# Patient Record
Sex: FEMALE | Race: WHITE
[De-identification: names, ages, dates, MRNs, and addresses within clinical notes are randomized per-mention and may not be internally consistent; named-entity substitution may affect disease eponyms.]

---

## 2018-10-14 ENCOUNTER — HOSPITAL ENCOUNTER (OUTPATIENT)
Dept: HOSPITAL 76 - EMS | Age: 63
Discharge: ANOTHER HOSPITAL | End: 2018-10-14
Attending: SURGERY
Payer: MEDICAID

## 2018-10-14 ENCOUNTER — HOSPITAL ENCOUNTER (EMERGENCY)
Dept: HOSPITAL 76 - ED | Age: 63
Discharge: ANOTHER HOSPITAL | End: 2018-10-14
Payer: SELF-PAY

## 2018-10-14 VITALS — DIASTOLIC BLOOD PRESSURE: 84 MMHG | SYSTOLIC BLOOD PRESSURE: 147 MMHG

## 2018-10-14 DIAGNOSIS — I10: ICD-10-CM

## 2018-10-14 DIAGNOSIS — R07.9: Primary | ICD-10-CM

## 2018-10-14 DIAGNOSIS — R06.00: ICD-10-CM

## 2018-10-14 DIAGNOSIS — Z79.82: ICD-10-CM

## 2018-10-14 DIAGNOSIS — Z79.84: ICD-10-CM

## 2018-10-14 DIAGNOSIS — I25.110: Primary | ICD-10-CM

## 2018-10-14 DIAGNOSIS — E11.9: ICD-10-CM

## 2018-10-14 DIAGNOSIS — R07.2: ICD-10-CM

## 2018-10-14 LAB
ALBUMIN DIAFP-MCNC: 4.7 G/DL (ref 3.2–5.5)
ALBUMIN/GLOB SERPL: 1.8 {RATIO} (ref 1–2.2)
ALP SERPL-CCNC: 62 IU/L (ref 42–121)
ALT SERPL W P-5'-P-CCNC: 18 IU/L (ref 10–60)
ANION GAP SERPL CALCULATED.4IONS-SCNC: 9 MMOL/L (ref 6–13)
AST SERPL W P-5'-P-CCNC: 20 IU/L (ref 10–42)
BASOPHILS NFR BLD AUTO: 0.1 10^3/UL (ref 0–0.1)
BASOPHILS NFR BLD AUTO: 1 %
BILIRUB BLD-MCNC: 0.6 MG/DL (ref 0.2–1)
BUN SERPL-MCNC: 13 MG/DL (ref 6–20)
CALCIUM UR-MCNC: 10 MG/DL (ref 8.5–10.3)
CHLORIDE SERPL-SCNC: 101 MMOL/L (ref 101–111)
CO2 SERPL-SCNC: 29 MMOL/L (ref 21–32)
CREAT SERPLBLD-SCNC: 0.6 MG/DL (ref 0.4–1)
EOSINOPHIL # BLD AUTO: 0.2 10^3/UL (ref 0–0.7)
EOSINOPHIL NFR BLD AUTO: 3 %
ERYTHROCYTE [DISTWIDTH] IN BLOOD BY AUTOMATED COUNT: 13.9 % (ref 12–15)
GFRSERPLBLD MDRD-ARVRAT: 101 ML/MIN/{1.73_M2} (ref 89–?)
GLOBULIN SER-MCNC: 2.6 G/DL (ref 2.1–4.2)
GLUCOSE SERPL-MCNC: 129 MG/DL (ref 70–100)
HGB UR QL STRIP: 13.8 G/DL (ref 12–16)
INR PPP: 1.1 (ref 0.8–1.2)
LIPASE SERPL-CCNC: 31 U/L (ref 22–51)
LYMPHOCYTES # SPEC AUTO: 1.8 10^3/UL (ref 1.5–3.5)
LYMPHOCYTES NFR BLD AUTO: 29.7 %
MAGNESIUM SERPL-MCNC: 2.2 MG/DL (ref 1.7–2.8)
MCH RBC QN AUTO: 29.3 PG (ref 27–31)
MCHC RBC AUTO-ENTMCNC: 33.5 G/DL (ref 32–36)
MCV RBC AUTO: 87.3 FL (ref 81–99)
MONOCYTES # BLD AUTO: 0.5 10^3/UL (ref 0–1)
MONOCYTES NFR BLD AUTO: 7.6 %
NEUTROPHILS # BLD AUTO: 3.6 10^3/UL (ref 1.5–6.6)
NEUTROPHILS # SNV AUTO: 6.1 X10^3/UL (ref 4.8–10.8)
NEUTROPHILS NFR BLD AUTO: 58.7 %
PDW BLD AUTO: 8.6 FL (ref 7.9–10.8)
PLATELET # BLD: 249 10^3/UL (ref 130–450)
PROT SPEC-MCNC: 7.3 G/DL (ref 6.7–8.2)
PROTHROM ACT/NOR PPP: 12.6 SECS (ref 9.9–12.6)
RBC MAR: 4.7 10^6/UL (ref 4.2–5.4)
SODIUM SERPLBLD-SCNC: 139 MMOL/L (ref 135–145)

## 2018-10-14 PROCEDURE — 96365 THER/PROPH/DIAG IV INF INIT: CPT

## 2018-10-14 PROCEDURE — 83735 ASSAY OF MAGNESIUM: CPT

## 2018-10-14 PROCEDURE — 85025 COMPLETE CBC W/AUTO DIFF WBC: CPT

## 2018-10-14 PROCEDURE — 96375 TX/PRO/DX INJ NEW DRUG ADDON: CPT

## 2018-10-14 PROCEDURE — 83690 ASSAY OF LIPASE: CPT

## 2018-10-14 PROCEDURE — 99285 EMERGENCY DEPT VISIT HI MDM: CPT

## 2018-10-14 PROCEDURE — 85610 PROTHROMBIN TIME: CPT

## 2018-10-14 PROCEDURE — 80053 COMPREHEN METABOLIC PANEL: CPT

## 2018-10-14 PROCEDURE — 84484 ASSAY OF TROPONIN QUANT: CPT

## 2018-10-14 PROCEDURE — 71046 X-RAY EXAM CHEST 2 VIEWS: CPT

## 2018-10-14 PROCEDURE — 36415 COLL VENOUS BLD VENIPUNCTURE: CPT

## 2018-10-14 PROCEDURE — 93005 ELECTROCARDIOGRAM TRACING: CPT

## 2018-10-14 PROCEDURE — 83880 ASSAY OF NATRIURETIC PEPTIDE: CPT

## 2018-10-14 PROCEDURE — 85730 THROMBOPLASTIN TIME PARTIAL: CPT

## 2018-10-14 PROCEDURE — 99284 EMERGENCY DEPT VISIT MOD MDM: CPT

## 2018-10-14 NOTE — XRAY REPORT
Reason:  chest pain left sided

Procedure Date:  10/14/2018   

Accession Number:  [id number] / [id number]                    

Procedure:  XR  - Chest 2 View X-Ray CPT Code:  70364

 

FULL RESULT:

 

 

EXAM:

CHEST RADIOGRAPHY

 

EXAM DATE: 10/14/2018 02:16 PM.

 

CLINICAL HISTORY: Chest pain left sided.

 

COMPARISON: None.

 

TECHNIQUE: 2 views.

 

FINDINGS:

Lungs/Pleura: No focal opacities evident. No pleural effusion. No 

pneumothorax. Normal volumes.

 

Mediastinum: Heart and mediastinal contours are unremarkable.

 

Other: None.

IMPRESSION: Negative chest.

 

RADIA

## 2018-10-14 NOTE — ED PHYSICIAN DOCUMENTATION
PD HPI CHEST PAIN





- Stated complaint


Stated Complaint: CHEST PX





- Chief complaint


Chief Complaint: Cardiac





- History obtained from


History obtained from: Patient





- History of Present Illness


Timing - onset: Yesterday


Timing - details: Gradual onset, Still present (more consistent starting this 

morning, with partial relief with oral NTG but not complete nor sustained. Feels

similar to prior heart pains/ unstable angina.), Waxing and waning


Quality: Pressure, Tightness, Aching


Location: Substernal, Left chest


Radiation: Jaw, Neck.  No: Back


Improved by: Nitro (incompletely).  No: Rest


Worsened by: No: Inspiration, Eating, Movement, Palpation


Similar symptoms before: Diagnosis (She has had pains like this with unstable 

angina and heart ischemia.)


Recently seen: Not recently seen





Review of Systems


Constitutional: denies: Fever, Chills, Myalgias


Nose: denies: Rhinorrhea / runny nose, Congestion


Throat: denies: Sore throat


Cardiac: reports: Chest pain / pressure.  denies: Palpitations, Pedal edema, 

Calf pain


Respiratory: reports: Dyspnea.  denies: Cough, Wheezing


GI: denies: Abdominal Pain, Nausea, Vomiting, Diarrhea


Skin: denies: Rash, Lesions


Neurologic: reports: Generalized weakness.  denies: Focal weakness, Difficulty 

speaking, Near syncope





PD PAST MEDICAL HISTORY





- Past Medical History


Cardiovascular: Hypertension, Coronary artery disease


Respiratory: None


Neuro: None


Endocrine/Autoimmune: Type 2 diabetes





- Present Medications


Home Medications: 


                                Ambulatory Orders











 Medication  Instructions  Recorded  Confirmed


 


Aspirin 81 mg PO 10/14/18 


 


Azilsartan Medoxomil [Edarbi] 40 mg 10/14/18 


 


Pitavastatin Calcium [Livalo] 4 mg PO 10/14/18 


 


Ticagrelor [Brilinta] 60 mg PO BID 10/14/18 10/14/18


 


metFORMIN [Glucophage] 500 mg PO BIDWM 10/14/18 














- Allergies


Allergies/Adverse Reactions: 


                                    Allergies











Allergy/AdvReac Type Severity Reaction Status Date / Time


 


lidocaine Allergy  Anaphylaxis Verified 10/14/18 13:22














- Living Situation


Living Situation: reports: With family


Living Arrangement: reports: At home, Other (recently moved here from [place] 2 

months ago.)





- Social History


Does the pt smoke?: No


Does the pt drink ETOH?: No


Does the pt have substance abuse?: No





- Family History


Family history: reports: CAD





PD ED PE NORMAL





- Vitals


Vital signs reviewed: Yes





- General


General: Alert and oriented X 3, No acute distress, Well developed/nourished





- HEENT


HEENT: Moist mucous membranes, Pharynx benign





- Neck


Neck: Supple, no meningeal sign, No adenopathy





- Cardiac


Cardiac: RRR, No murmur





- Respiratory


Respiratory: Clear bilaterally





- Abdomen


Abdomen: Soft, Non tender





- Back


Back: No CVA TTP





- Derm


Derm: Normal color, Warm and dry, No rash





- Extremities


Extremities: No deformity, No tenderness to palpate, Normal ROM s pain





- Neuro


Neuro: Alert and oriented X 3, No motor deficit, Normal speech


Eye Opening: Spontaneous


Motor: Obeys Commands


Verbal: Oriented


GCS Score: 15





Results





- Vitals


Vitals: 


                               Vital Signs - 24 hr











  10/14/18 10/14/18 10/14/18





  13:19 13:57 15:14


 


Heart Rate 71 62 68


 


Respiratory 18 11 L 13





Rate   


 


Blood Pressure 158/84 H 151/77 H 115/78


 


O2 Saturation 97 97 98








                                     Oxygen











O2 Source                      Room air

















- EKG (time done)


  ** 13:20


Rate: Rate (enter#) (69)


Rhythm: NSR


Axis: Normal


Intervals: Normal VT


QRS: Normal


Ischemia: Normal ST segments.  No: ST elevation c/w ischemia, ST depression





- Labs


Labs: 


                                Laboratory Tests











  10/14/18 10/14/18 10/14/18





  13:20 13:20 13:20


 


WBC  6.1  


 


RBC  4.70  


 


Hgb  13.8  


 


Hct  41.1  


 


MCV  87.3  


 


MCH  29.3  


 


MCHC  33.5  


 


RDW  13.9  


 


Plt Count  249  


 


MPV  8.6  


 


Neut # (Auto)  3.6  


 


Lymph # (Auto)  1.8  


 


Mono # (Auto)  0.5  


 


Eos # (Auto)  0.2  


 


Baso # (Auto)  0.1  


 


Absolute Nucleated RBC  0.00  


 


Nucleated RBC %  0.0  


 


PT   


 


INR   


 


APTT   


 


Sodium   139 


 


Potassium   3.9 


 


Chloride   101 


 


Carbon Dioxide   29 


 


Anion Gap   9.0 


 


BUN   13 


 


Creatinine   0.6 


 


Estimated GFR (MDRD)   101 


 


Glucose   129 H 


 


Calcium   10.0 


 


Magnesium   2.2 


 


Total Bilirubin   0.6 


 


AST   20 


 


ALT   18 


 


Alkaline Phosphatase   62 


 


Troponin I    < 0.04


 


B-Natriuretic Peptide   


 


Total Protein   7.3 


 


Albumin   4.7 


 


Globulin   2.6 


 


Albumin/Globulin Ratio   1.8 


 


Lipase   31 














  10/14/18 10/14/18





  13:20 13:20


 


WBC  


 


RBC  


 


Hgb  


 


Hct  


 


MCV  


 


MCH  


 


MCHC  


 


RDW  


 


Plt Count  


 


MPV  


 


Neut # (Auto)  


 


Lymph # (Auto)  


 


Mono # (Auto)  


 


Eos # (Auto)  


 


Baso # (Auto)  


 


Absolute Nucleated RBC  


 


Nucleated RBC %  


 


PT   12.6


 


INR   1.1


 


APTT   35.1 H


 


Sodium  


 


Potassium  


 


Chloride  


 


Carbon Dioxide  


 


Anion Gap  


 


BUN  


 


Creatinine  


 


Estimated GFR (MDRD)  


 


Glucose  


 


Calcium  


 


Magnesium  


 


Total Bilirubin  


 


AST  


 


ALT  


 


Alkaline Phosphatase  


 


Troponin I  


 


B-Natriuretic Peptide  25 


 


Total Protein  


 


Albumin  


 


Globulin  


 


Albumin/Globulin Ratio  


 


Lipase  














- Rads (name of study)


  ** chest xray


Radiology: Prelim report reviewed (no acute process), EMP read contemporaneously





PD MEDICAL DECISION MAKING





- ED course


Complexity details: re-evaluated patient (Her pain is much improved with 

nitroglycerin sublingually and then nitro drip.  She is on anticoagulants 

already.  She had an aspirin at home.  She does not have any EKG changes and her

 troponin is negative.  However there is no alternative diagnosis at this time. 

 Her symptoms are consistent with unstable angina in the past and she has had 

multiple stents with the last one being March of this past year.  I think she 

needs more appropriate interventions and testing that is possible at our 

facility.  I called Dr. Albarado is on for cardiology in [place] and he accepts 

transfer.), considered differential, d/w patient





Departure





- Departure


Disposition: 02 Transfer Acute Care [facility]


Clinical Impression: 


 Unstable angina





Chest pain


Qualifiers:


 Chest pain type: precordial pain Qualified Code(s): R07.2 - Precordial pain





Condition: Stable


Record reviewed to determine appropriate education?: Yes

## 2019-06-13 ENCOUNTER — HOSPITAL ENCOUNTER (OUTPATIENT)
Dept: HOSPITAL 76 - EMS | Age: 64
Discharge: ANOTHER HOSPITAL | End: 2019-06-13
Attending: SURGERY
Payer: MEDICAID

## 2019-06-13 DIAGNOSIS — R07.9: Primary | ICD-10-CM
